# Patient Record
Sex: MALE
[De-identification: names, ages, dates, MRNs, and addresses within clinical notes are randomized per-mention and may not be internally consistent; named-entity substitution may affect disease eponyms.]

---

## 2022-07-05 ENCOUNTER — DASHBOARD ENCOUNTERS (OUTPATIENT)
Age: 47
End: 2022-07-05

## 2022-07-05 ENCOUNTER — OFFICE VISIT (OUTPATIENT)
Dept: URBAN - NONMETROPOLITAN AREA CLINIC 13 | Facility: CLINIC | Age: 47
End: 2022-07-05

## 2024-09-24 ENCOUNTER — TRANSCRIPTION ENCOUNTER (OUTPATIENT)
Age: 49
End: 2024-09-24

## 2024-09-24 ENCOUNTER — APPOINTMENT (OUTPATIENT)
Dept: UROLOGY | Facility: CLINIC | Age: 49
End: 2024-09-24
Payer: COMMERCIAL

## 2024-09-24 VITALS
DIASTOLIC BLOOD PRESSURE: 67 MMHG | WEIGHT: 185 LBS | HEART RATE: 94 BPM | HEIGHT: 73 IN | OXYGEN SATURATION: 100 % | TEMPERATURE: 98.3 F | SYSTOLIC BLOOD PRESSURE: 120 MMHG | BODY MASS INDEX: 24.52 KG/M2

## 2024-09-24 DIAGNOSIS — R35.0 FREQUENCY OF MICTURITION: ICD-10-CM

## 2024-09-24 DIAGNOSIS — R10.2 PELVIC AND PERINEAL PAIN: ICD-10-CM

## 2024-09-24 DIAGNOSIS — Z78.9 OTHER SPECIFIED HEALTH STATUS: ICD-10-CM

## 2024-09-24 DIAGNOSIS — N39.0 URINARY TRACT INFECTION, SITE NOT SPECIFIED: ICD-10-CM

## 2024-09-24 PROBLEM — Z00.00 ENCOUNTER FOR PREVENTIVE HEALTH EXAMINATION: Status: ACTIVE | Noted: 2024-09-24

## 2024-09-24 PROCEDURE — 99205 OFFICE O/P NEW HI 60 MIN: CPT | Mod: 25

## 2024-09-24 PROCEDURE — 51798 US URINE CAPACITY MEASURE: CPT

## 2024-09-24 PROCEDURE — G2211 COMPLEX E/M VISIT ADD ON: CPT | Mod: NC

## 2024-09-24 RX ORDER — TAMSULOSIN HCL 0.4 MG
0.4 CAPSULE ORAL
Refills: 0 | Status: ACTIVE | COMMUNITY

## 2024-09-24 RX ORDER — ROSUVASTATIN CALCIUM 5 MG/1
TABLET, FILM COATED ORAL
Refills: 0 | Status: ACTIVE | COMMUNITY

## 2024-09-24 RX ORDER — SILODOSIN 8 MG/1
8 CAPSULE ORAL DAILY
Qty: 60 | Refills: 3 | Status: COMPLETED | COMMUNITY
Start: 2024-09-24 | End: 2024-09-24

## 2024-09-24 RX ORDER — MOMETASONE FUROATE MONOHYDRATE 50 UG/1
SPRAY, METERED NASAL
Refills: 0 | Status: ACTIVE | COMMUNITY

## 2024-09-24 RX ORDER — LORATADINE 5 MG/5 ML
SOLUTION, ORAL ORAL
Refills: 0 | Status: ACTIVE | COMMUNITY

## 2024-09-24 RX ORDER — ALFUZOSIN HYDROCHLORIDE 10 MG/1
10 TABLET, EXTENDED RELEASE ORAL DAILY
Qty: 90 | Refills: 3 | Status: ACTIVE | COMMUNITY
Start: 2024-09-24 | End: 1900-01-01

## 2024-09-25 LAB
APPEARANCE: CLEAR
BACTERIA: NEGATIVE /HPF
BILIRUBIN URINE: NEGATIVE
BLOOD URINE: NEGATIVE
CAST: 0 /LPF
COLOR: YELLOW
EPITHELIAL CELLS: 0 /HPF
GLUCOSE QUALITATIVE U: NEGATIVE MG/DL
KETONES URINE: NEGATIVE MG/DL
LEUKOCYTE ESTERASE URINE: NEGATIVE
MICROSCOPIC-UA: NORMAL
NITRITE URINE: NEGATIVE
PH URINE: 7
PROTEIN URINE: NEGATIVE MG/DL
RED BLOOD CELLS URINE: 0 /HPF
SPECIFIC GRAVITY URINE: 1.01
UROBILINOGEN URINE: 0.2 MG/DL
WHITE BLOOD CELLS URINE: 0 /HPF

## 2024-09-26 DIAGNOSIS — R33.9 RETENTION OF URINE, UNSPECIFIED: ICD-10-CM

## 2024-09-27 NOTE — ADDENDUM
[FreeTextEntry1] : 9/27/24: outside records:  Abdominal US 1/30/24 [facility]: kidneys wnl, + hepatomegaly  Cultures: 1/17/24: >100K e. coli 2/6/24: >100K e. coli (same sensitivity profile)  [place] 1/30/24: voided 425 Qmax 15..8   No cysto in notes

## 2024-09-27 NOTE — PHYSICAL EXAM
[General Appearance - Well Developed] : well developed [General Appearance - Well Nourished] : well nourished [Testes Tenderness] : no tenderness of the testes [Testes Mass (___cm)] : there were no testicular masses [Prostate Tenderness] : the prostate was not tender [Chaperone Declined] : Patient declined chaperone [de-identified] : patent but slightly narrowed meatus, + point tenderness pelvic floor

## 2024-09-27 NOTE — LETTER BODY
[Dear  ___] : Dear  [unfilled], [Courtesy Letter:] : I had the pleasure of seeing your patient, [unfilled], in my office today. [Please see my note below.] : Please see my note below. [Consult Closing:] : Thank you very much for allowing me to participate in the care of this patient.  If you have any questions, please do not hesitate to contact me. [Sincerely,] : Sincerely, [FreeTextEntry3] : Loni Cummings MD Director of Robotic Education [facility]   [email or web address] [phone]

## 2024-09-27 NOTE — PHYSICAL EXAM
[General Appearance - Well Developed] : well developed [General Appearance - Well Nourished] : well nourished [Testes Tenderness] : no tenderness of the testes [Testes Mass (___cm)] : there were no testicular masses [Prostate Tenderness] : the prostate was not tender [Chaperone Declined] : Patient declined chaperone [de-identified] : patent but slightly narrowed meatus, + point tenderness pelvic floor

## 2024-09-27 NOTE — ASSESSMENT
[FreeTextEntry1] : Mr. DONALD ALMARAZ is a 49 year male with bothersome BPH/LUTS, and recurrent UTIs. Symptoms improve with treatment UTIs.  We discussed unclear etiology of UTIs - likely related to incomplete emptying, and given age would attribute to BPH/LUTS. Prior urologist did cysto and renal US and all was wnl. No TRUS. + incomplete emptying which improved after persistent UTI episode, but then stopped alfuzosin. Patient reports had done PFPT as well in past for possibility of pelvic floor issue.  At this time, we discussed obtaining urine sample, determining if infection present, and further working-up in complete emptying. We discussed restarting alpha blocker (did start Flomax but gets palpitations so advised to stop). Patient currently trying to conceive, did IVF which failed, and now trying with intercourse. Flomax caused retrograde ejaculation for him. Alfuzosin did not, but did not work with alcohol. Discussed silodosin most selective but likely will cause retrograde. Would like to go back on alfuzosin and will avoid alcohol over this time. Understands may have small element of retrograde and decreased sperm count that is coming out.   We also discussed restarting PFPT over this time in case that is contributing to incomplete emptying and performing TRUS to evaluate prostate size. Will do uroflow next visit as well.   Plan:  - UA, culture - if UCx negative repeat next week given recent abx - alpha blockers - discussed types - alfuzosin and avoiding alcohol given did not give retrograde and TTC (though discussed may decrease counts) - already did cysto and renal US - wnl per patient - will get records - f/u one month uroflow/PVR/TRUS - we can consider UDS/repeat cysto based on findings - PSA once symptoms all normalize - discussed role of PFPT - will restart in case contributing - f/u one month - warning precautions including fevers, chills, inability to urinate discussed - let us know and may need emergency attention

## 2024-09-27 NOTE — LETTER BODY
[Dear  ___] : Dear  [unfilled], [Courtesy Letter:] : I had the pleasure of seeing your patient, [unfilled], in my office today. [Please see my note below.] : Please see my note below. [Consult Closing:] : Thank you very much for allowing me to participate in the care of this patient.  If you have any questions, please do not hesitate to contact me. [Sincerely,] : Sincerely, [FreeTextEntry3] : Loni Cummings MD Director of Robotic Education [facility] at [facility]   [email or web address] [phone]

## 2024-09-27 NOTE — HISTORY OF PRESENT ILLNESS
[FreeTextEntry1] : Name DONALD ALMARAZ  MRN [id number]   Aug 17 1975  Contact Number: [phone] ----------------------------------------------------------------------------------------------------------------------------------------- Date of First Visit: 24 Referring Provider/PCP: Dr. Maulik Peoples f:  -----------------------------------------------------------------------------------------------------------------------------------------  CC: Bothersome lower urinary tract symptoms (LUTS), recurrent UTIs  History of Present Illness: DONALD ALMARAZ is a 49 year male with LUTS/recurrent UTIs. Started in January - developed decreased urine flow, lower abdominal discomfort. Saw a urologist, found to have UTI and that bladder was not emptying. Treated with multiple rounds of antibiotics and started on alfuzosin - made him light headed with alcohol. Was told not fully emptying. Over this time obtained abdominal US which was wnl.  Also did cystoscopy and was told all wnl and told patient no prostate issues. Ultimately switched to Flomax and urine stopped testing positive. After a few weeks stopped Flomax - early March. Patient was then doing well for a few months - also had some GI issues/SIBO/GERD over this time - so was hard to tell. At baseline, no notable frequency, urgency, incomplete emptying. Good stream.  Then about 10 days ago developed incomplete emptying, weak stream, frequency, lower abdominal discomfort. No fevers or chills. Had leftover Bactrim at home and completed 5 days on Thursday which helped somewhat but not back to baseline. Then Friday restarted Flomax. Does report gets intermittent palpitations with Flomax. Symptoms still not back to baseline.  Patient reports also has IBS and went to pelvic  - had a few sessions and told to make dietary changes. Reports had one UTI in high school. Denies febrile UTI or pyelo as far as he knows.  Other Relevant History:  Previously taken medication for symptoms - Flomax, Alfuzosin Male sexual health/prostate supplements - no Prior history of urinary retention - no - just incomplete emptying  Prior episodes of prostatitis - no Prior urinary tract infections - as above History of bladder stones - no History of prior prostate biopsies - no  IPSS : 19; bother score = 5 MITZI: 22 PVR (to ensure adequate emptying):177  PSA History: no known prior values  Bladder triggers: caffeine 3 cups/day cut down to one, rare carbonation, decreased spicy food, decreased citrus, + alcohol, increased stress over this time, tried IVF x 2 recently and did not work  PMH: GERD, SIBO, IBS, HLD PSH:  sinus surgery Family History: no prostate cancer Social: , 1 child - interested in another, never smoker, regular alcohol, no recreational drugs,  Meds: Crestor, Flomax, Claritin, Nasonex Allergies: penicillin (sensation throat closing up) ROS: no fevers, chills, flank pain ----------------------------------------------------------------------------------------------------------------------------------------- 24: UA- dip small LE, nitrite positive  24: UCx : >100K e. coli 24: >100K e. coli

## 2024-09-27 NOTE — HISTORY OF PRESENT ILLNESS
[FreeTextEntry1] : Name DONALD ALMARAZ  MRN [id number]   Aug 17 1975  Contact Number: [phone] ----------------------------------------------------------------------------------------------------------------------------------------- Date of First Visit: 24 Referring Provider/PCP: Dr. Maulik Peoples f:  -----------------------------------------------------------------------------------------------------------------------------------------  CC: Bothersome lower urinary tract symptoms (LUTS), recurrent UTIs  History of Present Illness: DONALD ALMARAZ is a 49 year male with LUTS/recurrent UTIs. Started in January - developed decreased urine flow, lower abdominal discomfort. Saw a urologist, found to have UTI and that bladder was not emptying. Treated with multiple rounds of antibiotics and started on alfuzosin - made him light headed with alcohol. Was told not fully emptying. Over this time obtained abdominal US which was wnl.  Also did cystoscopy and was told all wnl and told patient no prostate issues. Ultimately switched to Flomax and urine stopped testing positive. After a few weeks stopped Flomax - early March. Patient was then doing well for a few months - also had some GI issues/SIBO/GERD over this time - so was hard to tell. At baseline, no notable frequency, urgency, incomplete emptying. Good stream.  Then about 10 days ago developed incomplete emptying, weak stream, frequency, lower abdominal discomfort. No fevers or chills. Had leftover Bactrim at home and completed 5 days on Thursday which helped somewhat but not back to baseline. Then Friday restarted Flomax. Does report gets intermittent palpitations with Flomax. Symptoms still not back to baseline.  Patient reports also has IBS and went to pelvic  - had a few sessions and told to make dietary changes. Reports had one UTI in high school. Denies febrile UTI or pyelo as far as he knows.  Other Relevant History:  Previously taken medication for symptoms - Flomax, Alfuzosin Male sexual health/prostate supplements - no Prior history of urinary retention - no - just incomplete emptying  Prior episodes of prostatitis - no Prior urinary tract infections - as above History of bladder stones - no History of prior prostate biopsies - no  IPSS : 19; bother score = 5 MITZI: 22 PVR (to ensure adequate emptying):177  PSA History: no known prior values  Bladder triggers: caffeine 3 cups/day cut down to one, rare carbonation, decreased spicy food, decreased citrus, + alcohol, increased stress over this time, tried IVF x 2 recently and did not work  PMH: GERD, SIBO, IBS, HLD PSH:  sinus surgery Family History: no prostate cancer Social: , 1 child - interested in another, never smoker, regular alcohol, no recreational drugs,  Meds: Crestor, Flomax, Claritin, Nasonex Allergies: penicillin (sensation throat closing up) ROS: no fevers, chills, flank pain ----------------------------------------------------------------------------------------------------------------------------------------- 24: UA- dip small LE, nitrite positive  24: UCx : >100K e. coli 24: >100K e. coli

## 2024-10-02 LAB — BACTERIA UR CULT: NORMAL

## 2024-10-03 LAB
APPEARANCE: CLEAR
BACTERIA: NEGATIVE /HPF
BILIRUBIN URINE: NEGATIVE
BLOOD URINE: NEGATIVE
CAST: 0 /LPF
COLOR: YELLOW
EPITHELIAL CELLS: 0 /HPF
GLUCOSE QUALITATIVE U: NEGATIVE MG/DL
KETONES URINE: NEGATIVE MG/DL
LEUKOCYTE ESTERASE URINE: NEGATIVE
MICROSCOPIC-UA: NORMAL
NITRITE URINE: NEGATIVE
PH URINE: 6
PROTEIN URINE: NEGATIVE MG/DL
RED BLOOD CELLS URINE: 0 /HPF
SPECIFIC GRAVITY URINE: 1.01
UROBILINOGEN URINE: 0.2 MG/DL
WHITE BLOOD CELLS URINE: 0 /HPF

## 2024-10-04 LAB — BACTERIA UR CULT: NORMAL

## 2024-10-07 ENCOUNTER — NON-APPOINTMENT (OUTPATIENT)
Age: 49
End: 2024-10-07

## 2024-10-22 ENCOUNTER — APPOINTMENT (OUTPATIENT)
Dept: UROLOGY | Facility: CLINIC | Age: 49
End: 2024-10-22

## 2024-10-22 DIAGNOSIS — Z12.5 ENCOUNTER FOR SCREENING FOR MALIGNANT NEOPLASM OF PROSTATE: ICD-10-CM

## 2024-10-22 PROCEDURE — 99214 OFFICE O/P EST MOD 30 MIN: CPT | Mod: 25

## 2024-10-22 PROCEDURE — G2211 COMPLEX E/M VISIT ADD ON: CPT | Mod: NC

## 2024-10-22 PROCEDURE — 51798 US URINE CAPACITY MEASURE: CPT | Mod: 59

## 2024-10-22 PROCEDURE — 51741 ELECTRO-UROFLOWMETRY FIRST: CPT

## 2024-10-23 ENCOUNTER — NON-APPOINTMENT (OUTPATIENT)
Age: 49
End: 2024-10-23

## 2024-10-23 LAB — PSA SERPL-MCNC: 0.31 NG/ML

## 2024-11-01 ENCOUNTER — OFFICE VISIT (OUTPATIENT)
Dept: FAMILY MEDICINE CLINIC | Facility: CLINIC | Age: 49
End: 2024-11-01
Payer: COMMERCIAL

## 2024-11-01 VITALS
WEIGHT: 292 LBS | OXYGEN SATURATION: 97 % | HEART RATE: 101 BPM | HEIGHT: 71 IN | DIASTOLIC BLOOD PRESSURE: 73 MMHG | TEMPERATURE: 98.7 F | RESPIRATION RATE: 18 BRPM | SYSTOLIC BLOOD PRESSURE: 110 MMHG | BODY MASS INDEX: 40.88 KG/M2

## 2024-11-01 DIAGNOSIS — I10 PRIMARY HYPERTENSION: Chronic | ICD-10-CM

## 2024-11-01 DIAGNOSIS — E55.9 VITAMIN D DEFICIENCY: Chronic | ICD-10-CM

## 2024-11-01 DIAGNOSIS — Z23 NEED FOR VACCINATION: ICD-10-CM

## 2024-11-01 DIAGNOSIS — Z00.00 ANNUAL PHYSICAL EXAM: Primary | ICD-10-CM

## 2024-11-01 DIAGNOSIS — D50.8 IRON DEFICIENCY ANEMIA SECONDARY TO INADEQUATE DIETARY IRON INTAKE: Chronic | ICD-10-CM

## 2024-11-01 DIAGNOSIS — Z53.20 COLONOSCOPY REFUSED: ICD-10-CM

## 2024-11-01 DIAGNOSIS — E11.9 ENCOUNTER FOR DIABETIC FOOT EXAM: ICD-10-CM

## 2024-11-01 DIAGNOSIS — E11.65 TYPE 2 DIABETES MELLITUS WITH HYPERGLYCEMIA, WITHOUT LONG-TERM CURRENT USE OF INSULIN: Chronic | ICD-10-CM

## 2024-11-01 PROBLEM — E78.00 HIGH CHOLESTEROL: Status: ACTIVE | Noted: 2019-07-26

## 2024-11-01 PROBLEM — I15.2 HYPERTENSION ASSOCIATED WITH TYPE 2 DIABETES MELLITUS: Status: ACTIVE | Noted: 2017-07-11

## 2024-11-01 PROBLEM — E11.59 HYPERTENSION ASSOCIATED WITH TYPE 2 DIABETES MELLITUS: Status: ACTIVE | Noted: 2017-07-11

## 2024-11-01 PROBLEM — D57.3 SICKLE CELL TRAIT: Status: ACTIVE | Noted: 2024-11-01

## 2024-11-01 PROBLEM — R40.0 DAYTIME SOMNOLENCE: Status: ACTIVE | Noted: 2020-10-08

## 2024-11-01 PROBLEM — I25.10 ARTERIOSCLEROSIS OF CORONARY ARTERY: Status: ACTIVE | Noted: 2017-07-11

## 2024-11-01 PROCEDURE — 99386 PREV VISIT NEW AGE 40-64: CPT | Performed by: NURSE PRACTITIONER

## 2024-11-01 PROCEDURE — 90656 IIV3 VACC NO PRSV 0.5 ML IM: CPT | Performed by: NURSE PRACTITIONER

## 2024-11-01 PROCEDURE — 90471 IMMUNIZATION ADMIN: CPT | Performed by: NURSE PRACTITIONER

## 2024-11-01 RX ORDER — ASPIRIN 325 MG
325 TABLET, DELAYED RELEASE (ENTERIC COATED) ORAL EVERY 6 HOURS PRN
COMMUNITY

## 2024-11-01 RX ORDER — OLMESARTAN MEDOXOMIL 40 MG/1
40 TABLET ORAL EVERY MORNING
COMMUNITY
End: 2024-11-04 | Stop reason: SDUPTHER

## 2024-11-01 RX ORDER — GLIPIZIDE 5 MG/1
TABLET ORAL
COMMUNITY
End: 2024-11-04 | Stop reason: SDUPTHER

## 2024-11-01 RX ORDER — FLUTICASONE PROPIONATE 50 MCG
SPRAY, SUSPENSION (ML) NASAL
COMMUNITY

## 2024-11-01 RX ORDER — FEXOFENADINE HCL 180 MG/1
TABLET ORAL
COMMUNITY

## 2024-11-01 NOTE — PROGRESS NOTES
Subjective     Chief Complaint   Patient presents with    Establish Care       History of Present Illness    Patient presents today to establish care.  He has a history of CHAMP and has enlarged tonsils.  He has not worn a CPAP in seven years.  He is needing medication refills with 90 supply.  History of heart cath without stents, type 2 diabetes, coronary arteriosclerosis, sickle cell trait.  Last A1c was 6.5.  Gastric sleeve in 2021 and has gained some weight since then.  Last diabetic eye exam was in February in [place].  He was to follow up in June with ophthalmology but has not due to moving.  He needs to establish with an opthomalogist.  He wants to wait until the beginning of the year to establish with opthomalogist.    Had dental cleaning yesterday.      Patient's PMR from outside medical facility reviewed and noted.    Review of Systems     Otherwise complete ROS reviewed and negative except as mentioned in the HPI.    Past Medical History: History reviewed. No pertinent past medical history.  Past Surgical History:History reviewed. No pertinent surgical history.  Social History:  reports that he has never smoked. He has never used smokeless tobacco. He reports that he does not currently use alcohol. He reports that he does not use drugs.    Family History: family history is not on file.      Allergies:  Allergies   Allergen Reactions    Diphenhydramine Hcl Other (See Comments)     seizure    Lisinopril Cough and Itching    Metformin Diarrhea    Semaglutide Other (See Comments)     Blood in the stools     Medications:  Prior to Admission medications    Medication Sig Start Date End Date Taking? Authorizing Provider   aspirin 325 MG EC tablet Take 1 tablet by mouth Every 6 (Six) Hours As Needed for Mild Pain.   Yes Cheng Mcnamara MD   fexofenadine (ALLEGRA) 180 MG tablet    Yes ProviderCheng MD   fluticasone (FLONASE) 50 MCG/ACT nasal spray    Yes ProviderCheng MD   glipizide  "(GLUCOTROL) 5 MG tablet    Yes Provider, Historical, MD   Mounjaro 12.5 MG/0.5ML solution pen-injector INJECT 12.5MG UNDER THE SKIN ONCE A WEEK FOR 12 WEEKS 8/21/24  Yes Cheng Mcnamara MD   olmesartan (BENICAR) 40 MG tablet Take 1 tablet by mouth Every Morning.   Yes Cheng Mcnamara MD   aspirin oral suspension Take 325 mL by mouth 1 (One) Time.  Patient not taking: Reported on 11/1/2024    Cheng Mcnamara MD       JUANI:      PHQ-9 Depression Screening  Little interest or pleasure in doing things? Not at all   Feeling down, depressed, or hopeless? Not at all   PHQ-2 Total Score 0   Trouble falling or staying asleep, or sleeping too much?     Feeling tired or having little energy?     Poor appetite or overeating?     Feeling bad about yourself - or that you are a failure or have let yourself or your family down?     Trouble concentrating on things, such as reading the newspaper or watching television?     Moving or speaking so slowly that other people could have noticed? Or the opposite - being so fidgety or restless that you have been moving around a lot more than usual?     Thoughts that you would be better off dead, or of hurting yourself in some way?     PHQ-9 Total Score     If you checked off any problems, how difficult have these problems made it for you to do your work, take care of things at home, or get along with other people?         PHQ-9 Total Score:           Objective     Vital Signs: /73 (BP Location: Right arm, Patient Position: Sitting, Cuff Size: Adult)   Pulse 101   Temp 98.7 °F (37.1 °C) (Infrared)   Resp 18   Ht 179.1 cm (70.5\")   Wt 132 kg (292 lb)   SpO2 97%   BMI 41.31 kg/m²   Physical Exam  Vitals and nursing note reviewed.   Constitutional:       Appearance: He is morbidly obese.   HENT:      Head: Normocephalic.      Nose: Nose normal.      Mouth/Throat:      Mouth: Mucous membranes are moist.   Eyes:      Conjunctiva/sclera: Conjunctivae normal. "   Cardiovascular:      Rate and Rhythm: Normal rate and regular rhythm.      Pulses: Normal pulses.      Heart sounds: Normal heart sounds.   Pulmonary:      Effort: Pulmonary effort is normal.      Breath sounds: Normal breath sounds.   Musculoskeletal:         General: Normal range of motion.   Feet:      Right foot:      Protective Sensation: 10 sites tested.  10 sites sensed.      Skin integrity: Skin integrity normal.      Left foot:      Protective Sensation: 10 sites tested.  10 sites sensed.      Skin integrity: Skin integrity normal.      Comments: Diabetic foot exam:   Left: Filament test completed   Pulses: dorsalis pedis and posterior tibialis 2+   Proprioception normal   Sharp/dull discrimination normal  Right: Filament test completed   Pulses: dorsalis pedis and posterior tibialis 2+   Proprioception normal   Sharp/dull discrimination normal    Skin:     General: Skin is warm and dry.   Neurological:      General: No focal deficit present.      Mental Status: He is alert and oriented to person, place, and time.   Psychiatric:         Mood and Affect: Mood normal.         Behavior: Behavior normal.         Class 3 Severe Obesity (BMI >=40). Obesity-related health conditions include the following: hypertension, coronary heart disease, diabetes mellitus, dyslipidemias, and osteoarthritis. Obesity is newly identified. BMI is is above average; BMI management plan is completed. We discussed low calorie, low carb based diet program, portion control, and increasing exercise.        Advance Care Planning            Results Reviewed:  Glucose   Date Value Ref Range Status   11/01/2024 84 70 - 99 mg/dL Final     BUN   Date Value Ref Range Status   11/01/2024 11 6 - 24 mg/dL Final   06/15/2021 11 7 - 25 mg/dL Final     Creatinine   Date Value Ref Range Status   11/01/2024 1.00 0.76 - 1.27 mg/dL Final   06/15/2021 0.76 0.60 - 1.35 mg/dL Final     Sodium   Date Value Ref Range Status   11/01/2024 139 134 - 144  mmol/L Final   06/15/2021 140 135 - 146 mmol/L Final     Potassium   Date Value Ref Range Status   11/01/2024 3.9 3.5 - 5.2 mmol/L Final   06/15/2021 4 3.5 - 5.3 mmol/L Final     Chloride   Date Value Ref Range Status   11/01/2024 103 96 - 106 mmol/L Final   06/15/2021 103 98 - 110 mmol/L Final     CO2   Date Value Ref Range Status   06/15/2021 30 20 - 32 mmol/L Final     Total CO2   Date Value Ref Range Status   11/01/2024 21 20 - 29 mmol/L Final     Calcium   Date Value Ref Range Status   11/01/2024 9.1 8.7 - 10.2 mg/dL Final   06/15/2021 9.3 8.6 - 10.3 mg/dL Final     ALT (SGPT)   Date Value Ref Range Status   11/01/2024 10 0 - 44 IU/L Final   06/15/2021 14 9 - 46 U/L Final     AST (SGOT)   Date Value Ref Range Status   11/01/2024 12 0 - 40 IU/L Final   06/15/2021 12 10 - 40 U/L Final     WBC   Date Value Ref Range Status   11/01/2024 8.0 3.4 - 10.8 x10E3/uL Final     Hematocrit   Date Value Ref Range Status   11/01/2024 34.2 (L) 37.5 - 51.0 % Final     Platelets   Date Value Ref Range Status   11/01/2024 369 150 - 450 x10E3/uL Final     Triglycerides   Date Value Ref Range Status   11/01/2024 215 (H) 0 - 149 mg/dL Final   10/08/2020 62 0 - 149 mg/dL Final     HDL Cholesterol   Date Value Ref Range Status   11/01/2024 32 (L) >39 mg/dL Final   10/08/2020 33 (L) >39 mg/dL Final     LDL Cholesterol    Date Value Ref Range Status   10/08/2020 46 0 - 99 mg/dL Final     LDL Chol Calc (NIH)   Date Value Ref Range Status   11/01/2024 77 0 - 99 mg/dL Final     Hemoglobin A1C   Date Value Ref Range Status   11/01/2024 6.6 (H) 4.8 - 5.6 % Final     Comment:              Prediabetes: 5.7 - 6.4           Diabetes: >6.4           Glycemic control for adults with diabetes: <7.0     02/26/2021 7.5 (H) 4.8 - 5.6 % Final     Comment:              Prediabetes: 5.7 - 6.4           Diabetes: >6.4           Glycemic control for adults with diabetes: <7.0         Assessment / Plan     Assessment/Plan     Diagnoses and all orders for  this visit:    1. Annual physical exam (Primary)  -     CBC (No Diff)  -     Comprehensive Metabolic Panel  -     Lipid Panel  -     TSH  -     Vitamin D,25-Hydroxy  -     Iron Profile  -     Ferritin  -     Hemoglobin A1c    2. Vitamin D deficiency  -     Vitamin D,25-Hydroxy    3. Iron deficiency anemia secondary to inadequate dietary iron intake  -     Iron Profile  -     Ferritin    4. Type 2 diabetes mellitus with hyperglycemia, without long-term current use of insulin  -     Comprehensive Metabolic Panel  -     Hemoglobin A1c  -     Microalbumin / Creatinine Urine Ratio - Urine, Clean Catch  -     Tirzepatide 12.5 MG/0.5ML solution auto-injector; Inject 12.5 mg under the skin into the appropriate area as directed 1 (One) Time Per Week for 90 days.  Dispense: 6 mL; Refill: 0  -     glipizide (GLUCOTROL) 5 MG tablet; Take 1 tablet by mouth 2 (Two) Times a Day Before Meals for 360 days.  Dispense: 180 tablet; Refill: 3  - Patient to establish with ophthalmology when his new insurance starts at the beginning of next year     5. Encounter for diabetic foot exam    6. Primary hypertension  -     olmesartan (BENICAR) 40 MG tablet; Take 1 tablet by mouth Every Morning for 360 days.  Dispense: 90 tablet; Refill: 3    7. Need for vaccination  -     Fluzone >6mos ([id number])    8. Colonoscopy refused   - Patient would like to wait until his new insurance starts at the beginning of next year and is agreeable to colon cancer screening at that time.                An After Visit Summary was printed and given to the patient at discharge.  Return in about 6 months (around 5/1/2025) for Follow up diabetes .    I have discussed the patient results/orders and plan/recommendation with them at today's visit.      Renae Mathew, HARVEY   11/01/2024

## 2024-11-02 LAB
25(OH)D3+25(OH)D2 SERPL-MCNC: 22.7 NG/ML (ref 30–100)
ALBUMIN SERPL-MCNC: 4.2 G/DL (ref 4.1–5.1)
ALBUMIN/CREAT UR: 4 MG/G CREAT (ref 0–29)
ALP SERPL-CCNC: 113 IU/L (ref 44–121)
ALT SERPL-CCNC: 10 IU/L (ref 0–44)
AST SERPL-CCNC: 12 IU/L (ref 0–40)
BILIRUB SERPL-MCNC: 0.3 MG/DL (ref 0–1.2)
BUN SERPL-MCNC: 11 MG/DL (ref 6–24)
BUN/CREAT SERPL: 11 (ref 9–20)
CALCIUM SERPL-MCNC: 9.1 MG/DL (ref 8.7–10.2)
CHLORIDE SERPL-SCNC: 103 MMOL/L (ref 96–106)
CHOLEST SERPL-MCNC: 145 MG/DL (ref 100–199)
CO2 SERPL-SCNC: 21 MMOL/L (ref 20–29)
CREAT SERPL-MCNC: 1 MG/DL (ref 0.76–1.27)
CREAT UR-MCNC: 213.2 MG/DL
EGFRCR SERPLBLD CKD-EPI 2021: 93 ML/MIN/1.73
ERYTHROCYTE [DISTWIDTH] IN BLOOD BY AUTOMATED COUNT: 16.5 % (ref 11.6–15.4)
FERRITIN SERPL-MCNC: 8 NG/ML (ref 30–400)
GLOBULIN SER CALC-MCNC: 2.9 G/DL (ref 1.5–4.5)
GLUCOSE SERPL-MCNC: 84 MG/DL (ref 70–99)
HBA1C MFR BLD: 6.6 % (ref 4.8–5.6)
HCT VFR BLD AUTO: 34.2 % (ref 37.5–51)
HDLC SERPL-MCNC: 32 MG/DL
HGB BLD-MCNC: 10.5 G/DL (ref 13–17.7)
IRON SATN MFR SERPL: 5 % (ref 15–55)
IRON SERPL-MCNC: 19 UG/DL (ref 38–169)
LDLC SERPL CALC-MCNC: 77 MG/DL (ref 0–99)
MCH RBC QN AUTO: 23 PG (ref 26.6–33)
MCHC RBC AUTO-ENTMCNC: 30.7 G/DL (ref 31.5–35.7)
MCV RBC AUTO: 75 FL (ref 79–97)
MICROALBUMIN UR-MCNC: 8.2 UG/ML
PLATELET # BLD AUTO: 369 X10E3/UL (ref 150–450)
POTASSIUM SERPL-SCNC: 3.9 MMOL/L (ref 3.5–5.2)
PROT SERPL-MCNC: 7.1 G/DL (ref 6–8.5)
RBC # BLD AUTO: 4.56 X10E6/UL (ref 4.14–5.8)
SODIUM SERPL-SCNC: 139 MMOL/L (ref 134–144)
TIBC SERPL-MCNC: 384 UG/DL (ref 250–450)
TRIGL SERPL-MCNC: 215 MG/DL (ref 0–149)
TSH SERPL DL<=0.005 MIU/L-ACNC: 0.91 UIU/ML (ref 0.45–4.5)
UIBC SERPL-MCNC: 365 UG/DL (ref 111–343)
VLDLC SERPL CALC-MCNC: 36 MG/DL (ref 5–40)
WBC # BLD AUTO: 8 X10E3/UL (ref 3.4–10.8)

## 2024-11-04 ENCOUNTER — TELEPHONE (OUTPATIENT)
Dept: FAMILY MEDICINE CLINIC | Facility: CLINIC | Age: 49
End: 2024-11-04
Payer: COMMERCIAL

## 2024-11-04 DIAGNOSIS — E55.9 VITAMIN D DEFICIENCY: ICD-10-CM

## 2024-11-04 DIAGNOSIS — E78.00 HIGH CHOLESTEROL: ICD-10-CM

## 2024-11-04 DIAGNOSIS — D50.9 IRON DEFICIENCY ANEMIA, UNSPECIFIED IRON DEFICIENCY ANEMIA TYPE: Primary | ICD-10-CM

## 2024-11-04 NOTE — TELEPHONE ENCOUNTER
----- Message from Renae Mathew sent at 11/4/2024  8:05 AM CST -----  Patient is very anemic with low iron levels.  Take 325 mg of ferrous sulfate iron supplement with 500 mg of vitamin C by mouth every other day.  Do not take at the same time as your other medications.  The ferrous sulfate and vitamin C should be taken on an empty stomach 1 hour before or 2 hours after a meal.  If the iron causes constipation you may take an over-the-counter Colace stool softener.  Will recheck iron levels in 3 months and if no improvement, will refer to hematology.      Triglycerides are slightly elevated, but this does correspond to patient not being fasting at time of labs, otherwise, generally well controlled.  Will recheck fasting lipid panel in 6 months.    A1c is well controlled at 6.6.  continue with current treatment.   Kidney function is excellent     Vitamin D is decreased. Patient should take OTC vitamin D3 600-800 IU daily.  Will recheck level in 6 months.

## 2024-11-06 RX ORDER — OLMESARTAN MEDOXOMIL 40 MG/1
40 TABLET ORAL EVERY MORNING
Qty: 90 TABLET | Refills: 3 | Status: SHIPPED | OUTPATIENT
Start: 2024-11-06 | End: 2025-11-01

## 2024-11-06 RX ORDER — GLIPIZIDE 5 MG/1
5 TABLET ORAL
Qty: 180 TABLET | Refills: 3 | Status: SHIPPED | OUTPATIENT
Start: 2024-11-06 | End: 2025-11-01

## 2024-11-15 ENCOUNTER — NON-APPOINTMENT (OUTPATIENT)
Age: 49
End: 2024-11-15

## 2024-11-15 LAB
APPEARANCE: CLEAR
BACTERIA: NEGATIVE /HPF
BILIRUBIN URINE: NEGATIVE
BLOOD URINE: NEGATIVE
CAST: 0 /LPF
COLOR: YELLOW
EPITHELIAL CELLS: 0 /HPF
GLUCOSE QUALITATIVE U: NEGATIVE MG/DL
KETONES URINE: NEGATIVE MG/DL
LEUKOCYTE ESTERASE URINE: NEGATIVE
MICROSCOPIC-UA: NORMAL
NITRITE URINE: NEGATIVE
PH URINE: 7
PROTEIN URINE: NEGATIVE MG/DL
RED BLOOD CELLS URINE: 0 /HPF
SPECIFIC GRAVITY URINE: 1.02
UROBILINOGEN URINE: 0.2 MG/DL
WHITE BLOOD CELLS URINE: 0 /HPF

## 2024-11-18 LAB — BACTERIA UR CULT: NORMAL

## 2024-12-18 ENCOUNTER — APPOINTMENT (OUTPATIENT)
Dept: UROLOGY | Facility: CLINIC | Age: 49
End: 2024-12-18
Payer: COMMERCIAL

## 2024-12-18 VITALS
OXYGEN SATURATION: 98 % | SYSTOLIC BLOOD PRESSURE: 149 MMHG | DIASTOLIC BLOOD PRESSURE: 74 MMHG | HEART RATE: 101 BPM | TEMPERATURE: 98.2 F

## 2024-12-18 DIAGNOSIS — R33.9 RETENTION OF URINE, UNSPECIFIED: ICD-10-CM

## 2024-12-18 DIAGNOSIS — R35.0 FREQUENCY OF MICTURITION: ICD-10-CM

## 2024-12-18 PROCEDURE — G2211 COMPLEX E/M VISIT ADD ON: CPT | Mod: NC

## 2024-12-18 PROCEDURE — 51798 US URINE CAPACITY MEASURE: CPT

## 2024-12-18 PROCEDURE — 99214 OFFICE O/P EST MOD 30 MIN: CPT | Mod: 25

## 2024-12-18 RX ORDER — SILODOSIN 8 MG/1
8 CAPSULE ORAL DAILY
Qty: 60 | Refills: 3 | Status: ACTIVE | COMMUNITY
Start: 2024-12-18 | End: 1900-01-01

## 2024-12-19 LAB
APPEARANCE: CLEAR
BACTERIA: NEGATIVE /HPF
BILIRUBIN URINE: NEGATIVE
BLOOD URINE: NEGATIVE
CAST: 0 /LPF
COLOR: YELLOW
EPITHELIAL CELLS: 0 /HPF
GLUCOSE QUALITATIVE U: NEGATIVE MG/DL
KETONES URINE: NEGATIVE MG/DL
LEUKOCYTE ESTERASE URINE: NEGATIVE
MICROSCOPIC-UA: NORMAL
NITRITE URINE: NEGATIVE
PH URINE: 6.5
PROTEIN URINE: NEGATIVE MG/DL
RED BLOOD CELLS URINE: 0 /HPF
SPECIFIC GRAVITY URINE: 1.01
UROBILINOGEN URINE: 0.2 MG/DL
WHITE BLOOD CELLS URINE: 0 /HPF

## 2024-12-20 LAB — BACTERIA UR CULT: NORMAL

## 2025-01-07 ENCOUNTER — TRANSCRIPTION ENCOUNTER (OUTPATIENT)
Age: 50
End: 2025-01-07

## 2025-01-10 ENCOUNTER — APPOINTMENT (OUTPATIENT)
Dept: UROLOGY | Facility: CLINIC | Age: 50
End: 2025-01-10
Payer: COMMERCIAL

## 2025-01-10 VITALS — SYSTOLIC BLOOD PRESSURE: 144 MMHG | HEART RATE: 101 BPM | TEMPERATURE: 98 F | DIASTOLIC BLOOD PRESSURE: 83 MMHG

## 2025-01-10 PROCEDURE — 99213 OFFICE O/P EST LOW 20 MIN: CPT | Mod: 25

## 2025-01-10 PROCEDURE — 52000 CYSTOURETHROSCOPY: CPT

## 2025-01-10 PROCEDURE — 51798 US URINE CAPACITY MEASURE: CPT

## 2025-01-14 ENCOUNTER — TRANSCRIPTION ENCOUNTER (OUTPATIENT)
Age: 50
End: 2025-01-14

## 2025-01-16 ENCOUNTER — TRANSCRIPTION ENCOUNTER (OUTPATIENT)
Age: 50
End: 2025-01-16

## 2025-01-30 DIAGNOSIS — E11.65 TYPE 2 DIABETES MELLITUS WITH HYPERGLYCEMIA, WITHOUT LONG-TERM CURRENT USE OF INSULIN: Chronic | ICD-10-CM

## 2025-01-31 DIAGNOSIS — E11.65 TYPE 2 DIABETES MELLITUS WITH HYPERGLYCEMIA, WITHOUT LONG-TERM CURRENT USE OF INSULIN: Chronic | ICD-10-CM

## 2025-03-24 ENCOUNTER — CLINICAL SUPPORT (OUTPATIENT)
Dept: FAMILY MEDICINE CLINIC | Facility: CLINIC | Age: 50
End: 2025-03-24
Payer: COMMERCIAL

## 2025-03-24 DIAGNOSIS — E55.9 VITAMIN D DEFICIENCY: ICD-10-CM

## 2025-03-24 DIAGNOSIS — E78.00 HIGH CHOLESTEROL: ICD-10-CM

## 2025-03-24 DIAGNOSIS — D50.9 IRON DEFICIENCY ANEMIA, UNSPECIFIED IRON DEFICIENCY ANEMIA TYPE: ICD-10-CM

## 2025-03-24 PROCEDURE — 36415 COLL VENOUS BLD VENIPUNCTURE: CPT | Performed by: NURSE PRACTITIONER

## 2025-03-24 NOTE — PROGRESS NOTES
Venipuncture Blood Specimen Collection  Venipuncture performed in [facility] by Joy Cotter LPN with good hemostasis. Patient tolerated the procedure well without complications.   03/24/25   Joy Cotter LPN

## 2025-03-25 LAB
25(OH)D3+25(OH)D2 SERPL-MCNC: 16.9 NG/ML (ref 30–100)
CHOLEST SERPL-MCNC: 143 MG/DL (ref 100–199)
ERYTHROCYTE [DISTWIDTH] IN BLOOD BY AUTOMATED COUNT: 16.9 % (ref 11.6–15.4)
FERRITIN SERPL-MCNC: 7 NG/ML (ref 30–400)
HCT VFR BLD AUTO: 35.7 % (ref 37.5–51)
HDLC SERPL-MCNC: 41 MG/DL
HGB BLD-MCNC: 11 G/DL (ref 13–17.7)
IRON SATN MFR SERPL: 6 % (ref 15–55)
IRON SERPL-MCNC: 24 UG/DL (ref 38–169)
LDLC SERPL CALC-MCNC: 91 MG/DL (ref 0–99)
MCH RBC QN AUTO: 22.9 PG (ref 26.6–33)
MCHC RBC AUTO-ENTMCNC: 30.8 G/DL (ref 31.5–35.7)
MCV RBC AUTO: 74 FL (ref 79–97)
PLATELET # BLD AUTO: 326 X10E3/UL (ref 150–450)
RBC # BLD AUTO: 4.81 X10E6/UL (ref 4.14–5.8)
TIBC SERPL-MCNC: 420 UG/DL (ref 250–450)
TRIGL SERPL-MCNC: 53 MG/DL (ref 0–149)
UIBC SERPL-MCNC: 396 UG/DL (ref 111–343)
VLDLC SERPL CALC-MCNC: 11 MG/DL (ref 5–40)
WBC # BLD AUTO: 5.2 X10E3/UL (ref 3.4–10.8)

## 2025-03-26 ENCOUNTER — RESULTS FOLLOW-UP (OUTPATIENT)
Dept: FAMILY MEDICINE CLINIC | Facility: CLINIC | Age: 50
End: 2025-03-26
Payer: COMMERCIAL

## 2025-04-23 ENCOUNTER — APPOINTMENT (OUTPATIENT)
Dept: UROLOGY | Facility: CLINIC | Age: 50
End: 2025-04-23
Payer: COMMERCIAL

## 2025-04-23 VITALS
HEIGHT: 73 IN | TEMPERATURE: 97.6 F | HEART RATE: 94 BPM | OXYGEN SATURATION: 98 % | DIASTOLIC BLOOD PRESSURE: 73 MMHG | WEIGHT: 200 LBS | SYSTOLIC BLOOD PRESSURE: 127 MMHG | BODY MASS INDEX: 26.51 KG/M2

## 2025-04-23 PROCEDURE — 99214 OFFICE O/P EST MOD 30 MIN: CPT | Mod: 25

## 2025-04-23 PROCEDURE — 51741 ELECTRO-UROFLOWMETRY FIRST: CPT

## 2025-04-23 PROCEDURE — 51798 US URINE CAPACITY MEASURE: CPT

## 2025-05-02 ENCOUNTER — OFFICE VISIT (OUTPATIENT)
Dept: FAMILY MEDICINE CLINIC | Facility: CLINIC | Age: 50
End: 2025-05-02
Payer: COMMERCIAL

## 2025-05-02 VITALS
HEART RATE: 94 BPM | TEMPERATURE: 98.6 F | BODY MASS INDEX: 38.98 KG/M2 | SYSTOLIC BLOOD PRESSURE: 122 MMHG | OXYGEN SATURATION: 95 % | WEIGHT: 278.4 LBS | DIASTOLIC BLOOD PRESSURE: 78 MMHG | HEIGHT: 71 IN

## 2025-05-02 DIAGNOSIS — E66.812 CLASS 2 SEVERE OBESITY DUE TO EXCESS CALORIES WITH SERIOUS COMORBIDITY AND BODY MASS INDEX (BMI) OF 39.0 TO 39.9 IN ADULT: ICD-10-CM

## 2025-05-02 DIAGNOSIS — E11.65 TYPE 2 DIABETES MELLITUS WITH HYPERGLYCEMIA, UNSPECIFIED WHETHER LONG TERM INSULIN USE: Primary | ICD-10-CM

## 2025-05-02 DIAGNOSIS — E66.01 CLASS 2 SEVERE OBESITY DUE TO EXCESS CALORIES WITH SERIOUS COMORBIDITY AND BODY MASS INDEX (BMI) OF 39.0 TO 39.9 IN ADULT: ICD-10-CM

## 2025-05-02 DIAGNOSIS — E78.00 HIGH CHOLESTEROL: ICD-10-CM

## 2025-05-02 LAB
EXPIRATION DATE: NORMAL
HBA1C MFR BLD: 5.6 % (ref 4.5–5.7)
Lab: NORMAL

## 2025-05-02 NOTE — PROGRESS NOTES
Subjective     Chief Complaint   Patient presents with    Follow-up       History of Present Illness    Patient present today for follow up.  He is tolerating Mounjaro well.  He has experienced hypoglycemia with glipizide with blood sugar 58.  He is wanting 3 month supply of Mounjaro.  Had cologuard that was normal in 2023.  We will request records.      Patient's PMR from outside medical facility reviewed and noted.    Review of Systems     Otherwise complete ROS reviewed and negative except as mentioned in the HPI.    Past Medical History:   Past Medical History:   Diagnosis Date    Anemia     Coronary artery disease     Diabetes mellitus     Hypertension     Obesity     Sickle cell anemia      Past Surgical History:  Past Surgical History:   Procedure Laterality Date    APPENDECTOMY      BARIATRIC SURGERY  2021     Social History:  reports that he has never smoked. He has never been exposed to tobacco smoke. He has never used smokeless tobacco. He reports that he does not currently use alcohol. He reports that he does not use drugs.    Family History: family history includes Depression in his mother; Diabetes in his mother; Heart disease in his mother; Hyperlipidemia in his mother.      Allergies:  Allergies   Allergen Reactions    Diphenhydramine Hcl Other (See Comments)     seizure    Diphenhydramine Other (See Comments)    Lisinopril Cough and Itching    Metformin Diarrhea    Semaglutide Other (See Comments)     Blood in the stools     Medications:  Prior to Admission medications    Medication Sig Start Date End Date Taking? Authorizing Provider   aspirin 325 MG EC tablet Take 1 tablet by mouth Every 6 (Six) Hours As Needed for Mild Pain.   Yes ProviderCheng MD   fexofenadine (ALLEGRA) 180 MG tablet    Yes ProviderCheng MD   fluticasone (FLONASE) 50 MCG/ACT nasal spray    Yes ProviderCheng MD   glipizide (GLUCOTROL) 5 MG tablet Take 1 tablet by mouth 2 (Two) Times a Day Before  "Meals for 360 days. 11/6/24 11/1/25 Yes Renae Mathew APRN   olmesartan (BENICAR) 40 MG tablet Take 1 tablet by mouth Every Morning for 360 days. 11/6/24 11/1/25 Yes Renae Mathew APRN   Tirzepatide 15 MG/0.5ML solution auto-injector Inject 15 mg under the skin into the appropriate area as directed 1 (One) Time Per Week. 2/3/25  Yes Renae Mathew APRN       JUANI:      PHQ-9 Depression Screening  Little interest or pleasure in doing things? Not at all   Feeling down, depressed, or hopeless? Not at all   PHQ-2 Total Score 0   Trouble falling or staying asleep, or sleeping too much?     Feeling tired or having little energy?     Poor appetite or overeating?     Feeling bad about yourself - or that you are a failure or have let yourself or your family down?     Trouble concentrating on things, such as reading the newspaper or watching television?     Moving or speaking so slowly that other people could have noticed? Or the opposite - being so fidgety or restless that you have been moving around a lot more than usual?     Thoughts that you would be better off dead, or of hurting yourself in some way?     PHQ-9 Total Score     If you checked off any problems, how difficult have these problems made it for you to do your work, take care of things at home, or get along with other people? Not difficult at all       PHQ-9 Total Score:   0  0 (Negative screening for depression)  Support given, observe for worsening symptoms    Objective     Vital Signs: /78 (BP Location: Right arm, Patient Position: Sitting, Cuff Size: Adult)   Pulse 94   Temp 98.6 °F (37 °C) (Infrared)   Ht 179.1 cm (70.51\")   Wt 126 kg (278 lb 6.4 oz)   SpO2 95%   BMI 39.37 kg/m²   Physical Exam  Vitals and nursing note reviewed.   Constitutional:       Appearance: He is obese.   HENT:      Head: Normocephalic.   Cardiovascular:      Rate and Rhythm: Normal rate and regular rhythm.      Pulses: Normal pulses.     "  Heart sounds: Normal heart sounds.   Pulmonary:      Effort: Pulmonary effort is normal.      Breath sounds: Normal breath sounds.   Musculoskeletal:         General: Normal range of motion.   Skin:     General: Skin is warm and dry.   Neurological:      General: No focal deficit present.      Mental Status: He is alert and oriented to person, place, and time.   Psychiatric:         Mood and Affect: Mood normal.         Behavior: Behavior normal.       Advance Care Planning            Results Reviewed:  Glucose   Date Value Ref Range Status   11/01/2024 84 70 - 99 mg/dL Final     BUN   Date Value Ref Range Status   11/01/2024 11 6 - 24 mg/dL Final   06/15/2021 11 7 - 25 mg/dL Final     Creatinine   Date Value Ref Range Status   11/01/2024 1.00 0.76 - 1.27 mg/dL Final   06/15/2021 0.76 0.60 - 1.35 mg/dL Final     Sodium   Date Value Ref Range Status   11/01/2024 139 134 - 144 mmol/L Final   06/15/2021 140 135 - 146 mmol/L Final     Potassium   Date Value Ref Range Status   11/01/2024 3.9 3.5 - 5.2 mmol/L Final   06/15/2021 4 3.5 - 5.3 mmol/L Final     Chloride   Date Value Ref Range Status   11/01/2024 103 96 - 106 mmol/L Final   06/15/2021 103 98 - 110 mmol/L Final     CO2   Date Value Ref Range Status   06/15/2021 30 20 - 32 mmol/L Final     Total CO2   Date Value Ref Range Status   11/01/2024 21 20 - 29 mmol/L Final     Calcium   Date Value Ref Range Status   11/01/2024 9.1 8.7 - 10.2 mg/dL Final   06/15/2021 9.3 8.6 - 10.3 mg/dL Final     ALT (SGPT)   Date Value Ref Range Status   11/01/2024 10 0 - 44 IU/L Final   06/15/2021 14 9 - 46 U/L Final     AST (SGOT)   Date Value Ref Range Status   11/01/2024 12 0 - 40 IU/L Final   06/15/2021 12 10 - 40 U/L Final     WBC   Date Value Ref Range Status   03/24/2025 5.2 3.4 - 10.8 x10E3/uL Final     Hematocrit   Date Value Ref Range Status   03/24/2025 35.7 (L) 37.5 - 51.0 % Final     Platelets   Date Value Ref Range Status   03/24/2025 326 150 - 450 x10E3/uL Final      Triglycerides   Date Value Ref Range Status   03/24/2025 53 0 - 149 mg/dL Final   10/08/2020 62 0 - 149 mg/dL Final     HDL Cholesterol   Date Value Ref Range Status   03/24/2025 41 >39 mg/dL Final   10/08/2020 33 (L) >39 mg/dL Final     LDL Cholesterol    Date Value Ref Range Status   10/08/2020 46 0 - 99 mg/dL Final     LDL Chol Calc (NIH)   Date Value Ref Range Status   03/24/2025 91 0 - 99 mg/dL Final     Hemoglobin A1C   Date Value Ref Range Status   05/02/2025 5.6 4.5 - 5.7 % Final   02/26/2021 7.5 (H) 4.8 - 5.6 % Final     Comment:              Prediabetes: 5.7 - 6.4           Diabetes: >6.4           Glycemic control for adults with diabetes: <7.0         Assessment / Plan     Assessment/Plan     Diagnoses and all orders for this visit:    1. Type 2 diabetes mellitus with hyperglycemia, unspecified whether long term insulin use (Primary)  Assessment & Plan:  Discontinue glipizide due to hypoglycemia.  Continue with Mounjaro.  Check blood sugar daily.      Orders:  -     POC Glycosylated Hemoglobin (Hb A1C)  -     atorvastatin (LIPITOR) 10 MG tablet; Take 1 tablet by mouth Every Night.  Dispense: 90 tablet; Refill: 3    2. Class 2 severe obesity due to excess calories with serious comorbidity and body mass index (BMI) of 39.0 to 39.9 in adult    3. High cholesterol  -     atorvastatin (LIPITOR) 10 MG tablet; Take 1 tablet by mouth Every Night.  Dispense: 90 tablet; Refill: 3               An After Visit Summary was printed and given to the patient at discharge.  Return in about 3 months (around 8/2/2025) for Follow up diabtes and anemia .    I have discussed the patient results/orders and plan/recommendation with them at today's visit.      Renae Mathew, HARVEY   05/02/2025

## 2025-05-04 DIAGNOSIS — J30.1 ALLERGIC RHINITIS DUE TO POLLEN, UNSPECIFIED SEASONALITY: Primary | ICD-10-CM

## 2025-05-05 RX ORDER — FLUTICASONE PROPIONATE 50 MCG
2 SPRAY, SUSPENSION (ML) NASAL DAILY
Qty: 16 G | Refills: 5 | Status: SHIPPED | OUTPATIENT
Start: 2025-05-05

## 2025-05-05 NOTE — TELEPHONE ENCOUNTER
Requested Prescriptions     Pending Prescriptions Disp Refills    fluticasone (FLONASE) 50 MCG/ACT nasal spray

## 2025-05-07 PROBLEM — E66.01 CLASS 2 SEVERE OBESITY DUE TO EXCESS CALORIES WITH SERIOUS COMORBIDITY AND BODY MASS INDEX (BMI) OF 39.0 TO 39.9 IN ADULT: Status: ACTIVE | Noted: 2025-05-07

## 2025-05-07 PROBLEM — E66.812 CLASS 2 SEVERE OBESITY DUE TO EXCESS CALORIES WITH SERIOUS COMORBIDITY AND BODY MASS INDEX (BMI) OF 39.0 TO 39.9 IN ADULT: Status: ACTIVE | Noted: 2025-05-07

## 2025-05-07 RX ORDER — ATORVASTATIN CALCIUM 10 MG/1
10 TABLET, FILM COATED ORAL NIGHTLY
Qty: 90 TABLET | Refills: 3 | Status: SHIPPED | OUTPATIENT
Start: 2025-05-07

## 2025-05-21 DIAGNOSIS — J30.1 ALLERGIC RHINITIS DUE TO POLLEN, UNSPECIFIED SEASONALITY: Primary | ICD-10-CM

## 2025-05-22 DIAGNOSIS — E11.65 TYPE 2 DIABETES MELLITUS WITH HYPERGLYCEMIA, WITHOUT LONG-TERM CURRENT USE OF INSULIN: Chronic | ICD-10-CM

## 2025-05-23 RX ORDER — FEXOFENADINE HCL 180 MG/1
180 TABLET ORAL DAILY
Qty: 90 TABLET | Refills: 3 | Status: SHIPPED | OUTPATIENT
Start: 2025-05-23 | End: 2026-05-18

## 2025-05-27 ENCOUNTER — TELEPHONE (OUTPATIENT)
Dept: FAMILY MEDICINE CLINIC | Facility: CLINIC | Age: 50
End: 2025-05-27
Payer: COMMERCIAL

## 2025-05-27 DIAGNOSIS — E11.65 TYPE 2 DIABETES MELLITUS WITH HYPERGLYCEMIA, WITHOUT LONG-TERM CURRENT USE OF INSULIN: Primary | ICD-10-CM

## 2025-05-27 RX ORDER — TIRZEPATIDE 15 MG/.5ML
15 INJECTION, SOLUTION SUBCUTANEOUS WEEKLY
Qty: 6 ML | Refills: 3 | Status: SHIPPED | OUTPATIENT
Start: 2025-05-27 | End: 2026-05-22

## 2025-05-27 NOTE — TELEPHONE ENCOUNTER
PT CAME INTO OFFICE ASKING IF THERE WAS A MEDICATION MIX UP. HE IS NEEDING TO GET HIS MONJARO RX FROM [facility] BUT HE WAS CALLED IN [facility].

## 2025-08-04 ENCOUNTER — OFFICE VISIT (OUTPATIENT)
Dept: FAMILY MEDICINE CLINIC | Facility: CLINIC | Age: 50
End: 2025-08-04
Payer: COMMERCIAL

## 2025-08-04 VITALS
WEIGHT: 271.8 LBS | TEMPERATURE: 97.8 F | DIASTOLIC BLOOD PRESSURE: 74 MMHG | BODY MASS INDEX: 38.05 KG/M2 | SYSTOLIC BLOOD PRESSURE: 110 MMHG | HEART RATE: 98 BPM | OXYGEN SATURATION: 97 % | HEIGHT: 71 IN

## 2025-08-04 DIAGNOSIS — E11.59 HYPERTENSION ASSOCIATED WITH TYPE 2 DIABETES MELLITUS: ICD-10-CM

## 2025-08-04 DIAGNOSIS — E66.01 CLASS 2 SEVERE OBESITY DUE TO EXCESS CALORIES WITH SERIOUS COMORBIDITY AND BODY MASS INDEX (BMI) OF 38.0 TO 38.9 IN ADULT: ICD-10-CM

## 2025-08-04 DIAGNOSIS — E66.812 CLASS 2 SEVERE OBESITY DUE TO EXCESS CALORIES WITH SERIOUS COMORBIDITY AND BODY MASS INDEX (BMI) OF 38.0 TO 38.9 IN ADULT: ICD-10-CM

## 2025-08-04 DIAGNOSIS — E11.65 TYPE 2 DIABETES MELLITUS WITH HYPERGLYCEMIA, WITHOUT LONG-TERM CURRENT USE OF INSULIN: Primary | ICD-10-CM

## 2025-08-04 DIAGNOSIS — D50.9 IRON DEFICIENCY ANEMIA, UNSPECIFIED IRON DEFICIENCY ANEMIA TYPE: ICD-10-CM

## 2025-08-04 DIAGNOSIS — I15.2 HYPERTENSION ASSOCIATED WITH TYPE 2 DIABETES MELLITUS: ICD-10-CM

## 2025-08-04 PROCEDURE — 99214 OFFICE O/P EST MOD 30 MIN: CPT | Performed by: NURSE PRACTITIONER

## 2025-08-05 LAB
ALBUMIN SERPL-MCNC: 4.3 G/DL (ref 4.1–5.1)
ALBUMIN/CREAT UR: 5 MG/G CREAT (ref 0–29)
ALP SERPL-CCNC: 121 IU/L (ref 44–121)
ALT SERPL-CCNC: 9 IU/L (ref 0–44)
AST SERPL-CCNC: 12 IU/L (ref 0–40)
BASOPHILS # BLD AUTO: 0.1 X10E3/UL (ref 0–0.2)
BASOPHILS NFR BLD AUTO: 1 %
BILIRUB SERPL-MCNC: 0.5 MG/DL (ref 0–1.2)
BUN SERPL-MCNC: 12 MG/DL (ref 6–24)
BUN/CREAT SERPL: 12 (ref 9–20)
CALCIUM SERPL-MCNC: 9.1 MG/DL (ref 8.7–10.2)
CHLORIDE SERPL-SCNC: 102 MMOL/L (ref 96–106)
CO2 SERPL-SCNC: 19 MMOL/L (ref 20–29)
CREAT SERPL-MCNC: 0.98 MG/DL (ref 0.76–1.27)
CREAT UR-MCNC: 384.6 MG/DL
EGFRCR SERPLBLD CKD-EPI 2021: 95 ML/MIN/1.73
EOSINOPHIL # BLD AUTO: 0.1 X10E3/UL (ref 0–0.4)
EOSINOPHIL NFR BLD AUTO: 1 %
ERYTHROCYTE [DISTWIDTH] IN BLOOD BY AUTOMATED COUNT: 15.9 % (ref 11.6–15.4)
FERRITIN SERPL-MCNC: 12 NG/ML (ref 30–400)
GLOBULIN SER CALC-MCNC: 2.8 G/DL (ref 1.5–4.5)
GLUCOSE SERPL-MCNC: 158 MG/DL (ref 70–99)
HBA1C MFR BLD: 6.3 % (ref 4.8–5.6)
HCT VFR BLD AUTO: 39.9 % (ref 37.5–51)
HGB BLD-MCNC: 12.6 G/DL (ref 13–17.7)
IMM GRANULOCYTES # BLD AUTO: 0 X10E3/UL (ref 0–0.1)
IMM GRANULOCYTES NFR BLD AUTO: 0 %
IRON SATN MFR SERPL: 19 % (ref 15–55)
IRON SERPL-MCNC: 74 UG/DL (ref 38–169)
LYMPHOCYTES # BLD AUTO: 1.1 X10E3/UL (ref 0.7–3.1)
LYMPHOCYTES NFR BLD AUTO: 23 %
MCH RBC QN AUTO: 25.1 PG (ref 26.6–33)
MCHC RBC AUTO-ENTMCNC: 31.6 G/DL (ref 31.5–35.7)
MCV RBC AUTO: 80 FL (ref 79–97)
MICROALBUMIN UR-MCNC: 20.2 UG/ML
MONOCYTES # BLD AUTO: 0.4 X10E3/UL (ref 0.1–0.9)
MONOCYTES NFR BLD AUTO: 7 %
NEUTROPHILS # BLD AUTO: 3.3 X10E3/UL (ref 1.4–7)
NEUTROPHILS NFR BLD AUTO: 68 %
PLATELET # BLD AUTO: 304 X10E3/UL (ref 150–450)
POTASSIUM SERPL-SCNC: 3.8 MMOL/L (ref 3.5–5.2)
PROT SERPL-MCNC: 7.1 G/DL (ref 6–8.5)
RBC # BLD AUTO: 5.01 X10E6/UL (ref 4.14–5.8)
SODIUM SERPL-SCNC: 138 MMOL/L (ref 134–144)
TIBC SERPL-MCNC: 381 UG/DL (ref 250–450)
UIBC SERPL-MCNC: 307 UG/DL (ref 111–343)
WBC # BLD AUTO: 4.9 X10E3/UL (ref 3.4–10.8)